# Patient Record
Sex: MALE | Race: WHITE | NOT HISPANIC OR LATINO | Employment: FULL TIME | URBAN - METROPOLITAN AREA
[De-identification: names, ages, dates, MRNs, and addresses within clinical notes are randomized per-mention and may not be internally consistent; named-entity substitution may affect disease eponyms.]

---

## 2017-10-16 ENCOUNTER — APPOINTMENT (OUTPATIENT)
Dept: GENERAL RADIOLOGY | Facility: HOSPITAL | Age: 32
End: 2017-10-16

## 2017-10-16 ENCOUNTER — HOSPITAL ENCOUNTER (EMERGENCY)
Facility: HOSPITAL | Age: 32
Discharge: HOME | End: 2017-10-16
Attending: EMERGENCY MEDICINE | Admitting: EMERGENCY MEDICINE

## 2017-10-16 VITALS
BODY MASS INDEX: 24.92 KG/M2 | TEMPERATURE: 97.5 F | SYSTOLIC BLOOD PRESSURE: 130 MMHG | HEIGHT: 71 IN | OXYGEN SATURATION: 97 % | DIASTOLIC BLOOD PRESSURE: 91 MMHG | RESPIRATION RATE: 15 BRPM | WEIGHT: 178 LBS | HEART RATE: 59 BPM

## 2017-10-16 DIAGNOSIS — S56.419A RUPTURE OF EXTENSOR TENDON OF FINGER: Primary | ICD-10-CM

## 2017-10-16 PROCEDURE — 73140 X-RAY EXAM OF FINGER(S): CPT

## 2017-10-16 PROCEDURE — 99283 EMERGENCY DEPT VISIT LOW MDM: CPT

## 2017-10-17 NOTE — ED PROVIDER NOTES
EMERGENCY DEPARTMENT ENCOUNTER    CHIEF COMPLAINT  Chief Complaint: Finger Injury  History given by: Patient  History limited by:   Room Number: Room/bed info not found  PMD: Provider Not In System      HPI:  Pt is a 32 y.o. male who presents complaining of L middle finger pain, which began while playing football three days ago. Pt states that he was impacted at the tip of his left middle finger and has trouble straigthening his left middle finger since the injury. Pt denies additional complaint.    Duration:  3 days ago  Onset: sudden  Timing: constant  Location: left middle finger  Radiation: none   Quality: pain  Intensity/Severity: mild  Progression: none  Associated Symptoms: decreased ROM of the left middle finger  Aggravating Factors: movement  Alleviating Factors: none  Previous Episodes: none  Treatment before arrival: none    PAST MEDICAL HISTORY  Active Ambulatory Problems     Diagnosis Date Noted   • No Active Ambulatory Problems     Resolved Ambulatory Problems     Diagnosis Date Noted   • No Resolved Ambulatory Problems     No Additional Past Medical History       PAST SURGICAL HISTORY  History reviewed. No pertinent surgical history.    FAMILY HISTORY  History reviewed. No pertinent family history.    SOCIAL HISTORY  Social History     Social History   • Marital status: Single     Spouse name: N/A   • Number of children: N/A   • Years of education: N/A     Occupational History   • Not on file.     Social History Main Topics   • Smoking status: Never Smoker   • Smokeless tobacco: Not on file   • Alcohol use Yes      Comment: sparingly   • Drug use: Not on file   • Sexual activity: Not on file     Other Topics Concern   • Not on file     Social History Narrative   • No narrative on file       ALLERGIES  Review of patient's allergies indicates no known allergies.    REVIEW OF SYSTEMS  Review of Systems   Constitutional: Negative for fever.   Respiratory: Negative for shortness of breath.     Cardiovascular: Negative for chest pain.   Musculoskeletal: Positive for arthralgias ( left middle finger ).        Decreased ROM of left middle finger       PHYSICAL EXAM  ED Triage Vitals   Temp Heart Rate Resp BP SpO2   10/16/17 2035 10/16/17 2035 10/16/17 2035 10/16/17 2133 10/16/17 2035   97.5 °F (36.4 °C) 59 15 130/91 97 %      Temp src Heart Rate Source Patient Position BP Location FiO2 (%)   10/16/17 2035 10/16/17 2035 10/16/17 2133 10/16/17 2133 --   Tympanic Monitor Sitting Left arm        Physical Exam   Constitutional: No distress.   HENT:   Head: Normocephalic and atraumatic.   Eyes: EOM are normal.   Neck: Normal range of motion.   Cardiovascular: Normal heart sounds.    Pulmonary/Chest: No respiratory distress.   Abdominal: There is no tenderness.   Musculoskeletal: He exhibits no edema.   Patient is unable to fully extend his left middle finger and has associated tenderness along the DIP joint   Neurological: He is alert. He has normal sensation.   Skin: Skin is warm and dry.   Nursing note and vitals reviewed.        RADIOLOGY  XR Finger 2+ View Left   Preliminary Result   No acute fracture or dislocation.                   I ordered the above noted radiological studies. Interpreted by radiologist. Reviewed by me in PACS.       PROCEDURES  Procedures  Splint Application:  Splint Type: finger splint  Indication: extensor tendon rupture  Splint placed by ERT  Post splint application:   1) neurovascularly intact   2) good position  Discussed splint care with patient  Discussed PMD/orthopedic follow up      PROGRESS AND CONSULTS  ED Course   Value Comment By Time   XR Finger 2+ View Left (Reviewed) Byron Hill MD 10/16 2247     2300- Notified patient of XR results, and likelihood that he ruptured his extensor tendon. Discussed with patient the plan for discharge with splint and referral to hand surgery. Patient understands and agrees with plan, all questions addressed.       MEDICAL DECISION  MAKING  Results were reviewed/discussed with the patient and they were also made aware of online access. Pt also made aware that follow up with PMD is necessary.     MDM  Number of Diagnoses or Management Options  Rupture of extensor tendon of finger:      Amount and/or Complexity of Data Reviewed  Tests in the radiology section of CPT®: ordered and reviewed (L finger XR shows nothing acute)  Independent visualization of images, tracings, or specimens: yes    Patient Progress  Patient progress: stable         DIAGNOSIS  Final diagnoses:   Rupture of extensor tendon of finger       DISPOSITION  DISCHARGE    Patient discharged in stable condition.    Reviewed implications of results, diagnosis, meds, responsibility to follow up, warning signs and symptoms of possible worsening, potential complications and reasons to return to ER.    Patient/Family voiced understanding of above instructions.    Discussed plan for discharge, as there is no emergent indication for admission.  Pt/family is agreeable and understands need for follow up and repeat testing.  Pt is aware that discharge does not mean that nothing is wrong but it indicates no emergency is present that requires admission and they must continue care with follow-up as given below or physician of their choice.     FOLLOW-UP  Jc Daley MD  [address] [place] [address]  [phone]    Schedule an appointment as soon as possible for a visit           Medication List      Notice     No changes were made to your prescriptions during this visit.            Latest Documented Vital Signs:  As of 11:09 PM  BP- 130/91 HR- 59 Temp- 97.5 °F (36.4 °C) (Tympanic) O2 sat- 97%    --  Documentation assistance provided by isabella Olivarez and Gerda Ramos for Dr. Hill.  Information recorded by the roberto carlos was done at my direction and has been verified and validated by me.       Gerda Ramos  10/16/17 7006     Roseanna Olivarez  10/17/17  0029       Byron Hill MD  10/17/17 1448

## 2017-10-17 NOTE — ED NOTES
He reports injuring it during a football game and thinking he just jammed it, but noticed obvious deformity to it comparing it to other hand.     Shanelle Ortega RN  10/16/17 0994

## 2021-03-26 ENCOUNTER — HOSPITAL ENCOUNTER (EMERGENCY)
Facility: HOSPITAL | Age: 36
Discharge: HOME | End: 2021-03-26
Attending: EMERGENCY MEDICINE | Admitting: EMERGENCY MEDICINE

## 2021-03-26 VITALS
DIASTOLIC BLOOD PRESSURE: 87 MMHG | SYSTOLIC BLOOD PRESSURE: 130 MMHG | OXYGEN SATURATION: 93 % | RESPIRATION RATE: 18 BRPM | TEMPERATURE: 96.7 F | HEART RATE: 85 BPM

## 2021-03-26 DIAGNOSIS — J02.9 PHARYNGITIS, UNSPECIFIED ETIOLOGY: Primary | ICD-10-CM

## 2021-03-26 PROCEDURE — 63710000001 DEXAMETHASONE PER 0.25 MG: Performed by: PHYSICIAN ASSISTANT

## 2021-03-26 PROCEDURE — 99282 EMERGENCY DEPT VISIT SF MDM: CPT

## 2021-03-26 RX ORDER — AMOXICILLIN 500 MG/1
1000 CAPSULE ORAL DAILY
Qty: 20 CAPSULE | Refills: 0 | Status: SHIPPED | OUTPATIENT
Start: 2021-03-26 | End: 2021-04-05

## 2021-03-26 RX ADMIN — DEXAMETHASONE 6 MG: 4 TABLET ORAL at 11:55

## 2021-03-26 NOTE — ED PROVIDER NOTES
MD ATTESTATION NOTE    The AUGUSTINE and I have discussed this patient's history, physical exam, and treatment plan.  I have reviewed the documentation and personally had a face to face interaction with the patient. I affirm the documentation and agree with the treatment and plan.  The attached note describes my personal findings.      Zuhair Christian Jr. is a 35 y.o. male who presents to the ED c/o sore throat for the last 4 days.  He reports that he went to urgent care center yesterday and was tested negative for strep, flu, mono, Covid.  He reports he was put on nystatin swish.  He reports no improvement.  He states his tonsils have always been large.  He has never had strep.  He reports the pain in his anterior neck is improved but his pain in his throat is persistent.  He denies abdominal pain.  He denies trouble breathing or swallowing.      On exam:  GENERAL: Awake, alert, no acute distress  SKIN: Warm, dry  HENT: Normocephalic, atraumatic, no pharyngeal asymmetry.  No stridor or drooling.  No uvula deviation.  Bilateral tonsils are large with exudates.  EYES: no scleral icterus  CV: regular rhythm, regular rate  RESPIRATORY: normal effort, lungs clear  ABDOMEN: soft, non-tender, non-distended  MUSCULOSKELETAL: no deformity  NEURO: alert, moves all extremities, follows commands    Labs  No results found for this or any previous visit (from the past 24 hour(s)).    Radiology  No Radiology Exams Resulted Within Past 24 Hours    Medical Decision Making:       Plan to treat him for tonsillitis with antibiotics.  Plan to refer him to ear nose and throat.  Plan to give him a dose of Decadron for symptom control.    PPE: Both the patient and I wore a surgical mask throughout the entire patient encounter. I wore protective goggles.     Diagnosis  Final diagnoses:   Pharyngitis, unspecified etiology        David Aguillon MD  03/26/21 2999

## 2021-03-26 NOTE — ED TRIAGE NOTES
Sore throat started yesterday, negative covid, strep and mono at [facility] given nystatin mouth wash. States worse today.     Patient masked in first look triage. I was wearing mask and goggles.

## 2021-03-26 NOTE — ED PROVIDER NOTES
EMERGENCY DEPARTMENT ENCOUNTER    Room Number:  25/25  Date seen:  3/28/2021  Time seen: 11:27 EDT  PCP: Provider, No Known  Historian: Patient    HPI:  Chief complaint: Sore throat   A complete HPI/ROS/PMH/PSH/SH/FH are unobtainable due to: None  Context:Zuhair Christian Jr. is a 35 y.o. male, who presents to the ED with c/o sore throat and discoloration right tongue for 4 days, he went to urgent care yesterday and was negative for strep, mono, flu, Covid.  Urgent care prescribe patient nystatin.  He is now here to get a second opinion.  Associated symptoms include subjective fever this morning.  Denies cough, loss of taste or smell, shortness of breath, chest pain.      Patient was placed in face mask in first look. Patient was wearing facemask when I entered the room and throughout our encounter. I wore full protective equipment throughout this patient encounter including a face mask, goggles, and gloves. Hand hygiene was performed before donning protective equipment and after removal when leaving the room.      MEDICAL RECORD REVIEW  Patient was last seen here October 2016 for left middle finger extensor tendon rupture.    ALLERGIES  Patient has no known allergies.    PAST MEDICAL HISTORY  Active Ambulatory Problems     Diagnosis Date Noted   • No Active Ambulatory Problems     Resolved Ambulatory Problems     Diagnosis Date Noted   • No Resolved Ambulatory Problems     No Additional Past Medical History       PAST SURGICAL HISTORY  No past surgical history on file.    FAMILY HISTORY  No family history on file.    SOCIAL HISTORY  Social History     Socioeconomic History   • Marital status: Single     Spouse name: Not on file   • Number of children: Not on file   • Years of education: Not on file   • Highest education level: Not on file   Tobacco Use   • Smoking status: Never Smoker   Substance and Sexual Activity   • Alcohol use: Yes     Comment: sparingly       REVIEW OF SYSTEMS  Review of Systems    All systems  reviewed and negative except for those discussed in HPI.     PHYSICAL EXAM    ED Triage Vitals [03/26/21 1123]   Temp Heart Rate Resp BP SpO2   96.7 °F (35.9 °C) 85 18 -- 93 %      Temp src Heart Rate Source Patient Position BP Location FiO2 (%)   Tympanic Monitor -- -- --     Physical Exam    I have reviewed the triage vital signs and nursing notes.      GENERAL: not distressed  HENT: nares patent; bilateral tonsils are large with exudates; no pharyngeal asymmetry. No stridor or drooling; no uvula deviation  EYES: no scleral icterus  NECK: no ROM limitations  CV: regular rhythm, regular rate  RESPIRATORY: normal effort; ctab  ABDOMEN: soft  : deferred  MUSCULOSKELETAL: no deformity  NEURO: alert, moves all extremities, follows commands  SKIN: warm, dry    LAB RESULTS  No results found for this or any previous visit (from the past 24 hour(s)).      RADIOLOGY RESULTS  No orders to display         PROGRESS, DATA ANALYSIS, CONSULTS AND MEDICAL DECISION MAKING  All labs have been independently reviewed by me.  All radiology studies have been reviewed by me and discussed with radiologist dictating the report. Discussion below represents my analysis of pertinent findings related to patient's condition, differential diagnosis, treatment plan and final disposition.     ED Course as of Mar 28 1935   Fri Mar 26, 2021   1200 Patient is able to handle oral secretions with no complications, had negative strep, mono, and covid at urgent care yesterday. Will treat his symptoms with Decadron and rx ABX. He is well appearing and safe to be d/c home.     [SS]      ED Course User Index  [SS] Sue Gomez PA-C       The differential diagnosis include but are not limited to peritonsillar abscess, strep throat, mono.     Reviewed pt's history and workup with Dr. Aguillon.  After a bedside evaluation, Dr. Aguillon agrees with the plan of care.    (FOR DISCHARGE)The patient's history, physical exam, and lab findings were discussed with  the physician, who also performed a face to face history and physical exam.  I discussed all results and noted any abnormalities with patient.  Discussed absoute need to recheck abnormalities with their family physician.  I answered any of the patient's questions.  Discussed plan for discharge, as there is no emergent indication for admission.  Pt is agreeable and understands need for follow up and repeat testing.  Pt is aware that discharge does not mean that nothing is wrong but it indicates no emergency is present and they must continue care with their family physician.  Pt is discharged with instructions to follow up with primary care doctor to have their blood pressure rechecked.              Disposition vitals:  /87   Pulse 85   Temp 96.7 °F (35.9 °C) (Tympanic)   Resp 18   SpO2 93%       DIAGNOSIS  Final diagnoses:   Pharyngitis, unspecified etiology       FOLLOW UP   PATIENT CONNECTION - [place] [id number]  [phone]  Call   To establish a primary care doctor    Haider Rolle MD  [address] [place] [address]  [phone]    Call in 1 day      [facility] Emergency Department  [address] [facility] [address] [phone]    As needed, If symptoms worsen         Sue Gomez PA-C  03/28/21 3500

## 2021-03-26 NOTE — DISCHARGE INSTRUCTIONS
Please call Dr. Rolle, ENT for a follow-up regarding your sore throat.  Take prescription as prescribed.  Return to the ER if develop any concerning or worsening symptoms.

## 2021-04-16 ENCOUNTER — BULK ORDERING (OUTPATIENT)
Dept: CASE MANAGEMENT | Facility: OTHER | Age: 36
End: 2021-04-16

## 2021-04-16 DIAGNOSIS — Z23 IMMUNIZATION DUE: ICD-10-CM
